# Patient Record
Sex: FEMALE | Race: WHITE
[De-identification: names, ages, dates, MRNs, and addresses within clinical notes are randomized per-mention and may not be internally consistent; named-entity substitution may affect disease eponyms.]

---

## 2018-04-26 ENCOUNTER — HOSPITAL ENCOUNTER (INPATIENT)
Dept: HOSPITAL 96 - M.ERS | Age: 58
LOS: 3 days | DRG: 208 | End: 2018-04-29
Attending: INTERNAL MEDICINE | Admitting: INTERNAL MEDICINE
Payer: COMMERCIAL

## 2018-04-26 VITALS — SYSTOLIC BLOOD PRESSURE: 91 MMHG | DIASTOLIC BLOOD PRESSURE: 62 MMHG

## 2018-04-26 VITALS — WEIGHT: 182.76 LBS | HEIGHT: 67.99 IN | BODY MASS INDEX: 27.7 KG/M2

## 2018-04-26 VITALS — SYSTOLIC BLOOD PRESSURE: 94 MMHG | DIASTOLIC BLOOD PRESSURE: 51 MMHG

## 2018-04-26 VITALS — DIASTOLIC BLOOD PRESSURE: 57 MMHG | SYSTOLIC BLOOD PRESSURE: 91 MMHG

## 2018-04-26 VITALS — SYSTOLIC BLOOD PRESSURE: 88 MMHG | DIASTOLIC BLOOD PRESSURE: 46 MMHG

## 2018-04-26 VITALS — DIASTOLIC BLOOD PRESSURE: 62 MMHG | SYSTOLIC BLOOD PRESSURE: 105 MMHG

## 2018-04-26 VITALS — SYSTOLIC BLOOD PRESSURE: 105 MMHG | DIASTOLIC BLOOD PRESSURE: 52 MMHG

## 2018-04-26 VITALS — DIASTOLIC BLOOD PRESSURE: 54 MMHG | SYSTOLIC BLOOD PRESSURE: 94 MMHG

## 2018-04-26 VITALS — DIASTOLIC BLOOD PRESSURE: 56 MMHG | SYSTOLIC BLOOD PRESSURE: 106 MMHG

## 2018-04-26 VITALS — DIASTOLIC BLOOD PRESSURE: 56 MMHG | SYSTOLIC BLOOD PRESSURE: 100 MMHG

## 2018-04-26 VITALS — SYSTOLIC BLOOD PRESSURE: 105 MMHG | DIASTOLIC BLOOD PRESSURE: 64 MMHG

## 2018-04-26 VITALS — DIASTOLIC BLOOD PRESSURE: 57 MMHG | SYSTOLIC BLOOD PRESSURE: 99 MMHG

## 2018-04-26 VITALS — SYSTOLIC BLOOD PRESSURE: 97 MMHG | DIASTOLIC BLOOD PRESSURE: 50 MMHG

## 2018-04-26 VITALS — SYSTOLIC BLOOD PRESSURE: 104 MMHG | DIASTOLIC BLOOD PRESSURE: 52 MMHG

## 2018-04-26 DIAGNOSIS — C79.31: ICD-10-CM

## 2018-04-26 DIAGNOSIS — A04.72: ICD-10-CM

## 2018-04-26 DIAGNOSIS — R65.10: ICD-10-CM

## 2018-04-26 DIAGNOSIS — C79.51: ICD-10-CM

## 2018-04-26 DIAGNOSIS — C34.11: ICD-10-CM

## 2018-04-26 DIAGNOSIS — N17.0: ICD-10-CM

## 2018-04-26 DIAGNOSIS — C79.49: ICD-10-CM

## 2018-04-26 DIAGNOSIS — C78.7: ICD-10-CM

## 2018-04-26 DIAGNOSIS — J96.01: ICD-10-CM

## 2018-04-26 DIAGNOSIS — Z79.899: ICD-10-CM

## 2018-04-26 DIAGNOSIS — J96.02: ICD-10-CM

## 2018-04-26 DIAGNOSIS — C79.70: ICD-10-CM

## 2018-04-26 DIAGNOSIS — D61.818: ICD-10-CM

## 2018-04-26 DIAGNOSIS — I08.3: ICD-10-CM

## 2018-04-26 DIAGNOSIS — R04.89: ICD-10-CM

## 2018-04-26 DIAGNOSIS — E43: ICD-10-CM

## 2018-04-26 DIAGNOSIS — J18.9: Primary | ICD-10-CM

## 2018-04-26 DIAGNOSIS — I82.432: ICD-10-CM

## 2018-04-26 DIAGNOSIS — Z88.8: ICD-10-CM

## 2018-04-26 LAB
ABSOLUTE MONOCYTES: 0 THOU/UL (ref 0–1.2)
ALBUMIN SERPL-MCNC: 2 G/DL (ref 3.4–5)
ALP SERPL-CCNC: 83 U/L (ref 46–116)
ALT SERPL-CCNC: 90 U/L (ref 30–65)
ANION GAP SERPL CALC-SCNC: 9 MMOL/L (ref 7–16)
APTT BLD: 29.6 SECONDS (ref 25–31.3)
AST SERPL-CCNC: 26 U/L (ref 15–37)
BE: -1.4 MMOL/L
BILIRUB SERPL-MCNC: 1.1 MG/DL
BUN SERPL-MCNC: 15 MG/DL (ref 7–18)
CALCIUM SERPL-MCNC: 7.3 MG/DL (ref 8.5–10.1)
CHLORIDE SERPL-SCNC: 95 MMOL/L (ref 98–107)
CO2 SERPL-SCNC: 24 MMOL/L (ref 21–32)
CREAT SERPL-MCNC: 0.8 MG/DL (ref 0.6–1.3)
DOHLE BOD BLD QL SMEAR: (no result)
GLUCOSE SERPL-MCNC: 120 MG/DL (ref 70–99)
GRANULOCYTES NFR BLD MANUAL: 54 %
HCO3 BLD-SCNC: 22.4 MMOL/L (ref 22–26)
HCT VFR BLD CALC: 21.4 % (ref 37–47)
HGB BLD-MCNC: 7.4 GM/DL (ref 12–15)
INR PPP: 1.2
LIPASE: 56 U/L (ref 73–393)
LYMPHOCYTES # BLD: 0.7 THOU/UL (ref 0.8–5.3)
LYMPHOCYTES NFR BLD AUTO: 37 %
MCH RBC QN AUTO: 29.9 PG (ref 26–34)
MCHC RBC AUTO-ENTMCNC: 34.5 G/DL (ref 28–37)
MCV RBC: 86.5 FL (ref 80–100)
MONOCYTES NFR BLD: 3 %
MPV: 8.6 FL. (ref 7.2–11.1)
NEUTROPHILS # BLD: 0.9 THOU/UL (ref 1.6–8.1)
NUCLEATED RBCS: 0 /100WBC
PCO2 BLD: 33.4 MMHG (ref 35–45)
PLATELET # BLD EST: (no result) 10*3/UL
PLATELET COUNT*: 10 THOU/UL (ref 150–400)
PO2 BLD: 65 MMHG (ref 75–100)
POTASSIUM SERPL-SCNC: 3.8 MMOL/L (ref 3.5–5.1)
PROT SERPL-MCNC: 6.4 G/DL (ref 6.4–8.2)
PROTHROMBIN TIME: 11.8 SECONDS (ref 9.2–11.5)
RBC # BLD AUTO: 2.47 MIL/UL (ref 4.2–5)
RDW-CV: 17 % (ref 10.5–14.5)
SODIUM SERPL-SCNC: 128 MMOL/L (ref 136–145)
TOXIC GRANULES BLD QL SMEAR: (no result)
TROPONIN-I LEVEL: 0.34 NG/ML (ref ?–0.06)
VARIANT LYMPHS NFR BLD MANUAL: 6 %
WBC # BLD AUTO: 1.6 THOU/UL (ref 4–11)
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
## 2018-04-26 NOTE — EKG
[place] [address]
Phone:  [phone]                     ELECTROCARDIOGRAM REPORT      
_______________________________________________________________________________
 
Name:       GARIMA TRENT              Room:           [address] [place]  
M.R.#:  [id number]      Account #:      [id number]  
Admission:  18     Attend Phys:    Ree Hudson MD 
Discharge:               Date of Birth:  [birth year]  
         Report #: [id number]
    [id number]
_______________________________________________________________________________
THIS REPORT FOR:  //name//                      
 
                         [facility] ED
                                       
Test Date:    2018               Test Time:    10:56:35
Pat Name:     GARIMA TRENT          Department:   
Patient ID:   [id number]            Room:         [facility]
Gender:       F                        Technician:   SHONDA SALEH
:          1960               Requested By: Shayan Padilla
Order Number: [id number] MD:   yT Reza
                                 Measurements
Intervals                              Axis          
Rate:         100                      P:            59
HI:           137                      QRS:          42
QRSD:         99                       T:            18
QT:           363                                    
QTc:          469                                    
                           Interpretive Statements
Sinus tachycardia
RSR' in V1 or V2, right VCD or RVH
No previous ECG available for comparison
 
Electronically Signed On 2018 16:38:57 CDT by Ty Reza
[email or web address]
 
 
 
 
 
 
 
 
 
 
 
 
 
 
 
 
 
 
 
  <ELECTRONICALLY SIGNED>
                                           By: Ty Reza MD, [facility]     
  18     9848
D: 18 1056   _____________________________________
T: 18 1056   Ty Reza MD, [facility]       /EPI

## 2018-04-26 NOTE — 2DMMODE
[place] [address]
Phone:  [phone] 2 D/M-MODE ECHOCARDIOGRAM     
_______________________________________________________________________________
 
Name:         GARIMA TRENT             Room:          [address] [place] 
[facility]#:    [id number]     Account #:     [id number]  
Admission:    18    Attend Phys:   Ree Hudson, 
Discharge:                Date of Birth: [birth year]  
Date of Service: 18 1519  Report #:      [id number]
        [id number]
_______________________________________________________________________________
THIS REPORT FOR:  //name//                      
 
 
--------------- APPROVED REPORT --------------
 
 
Study performed:  2018 13:44:57
 
EXAM: Comprehensive 2D, Doppler, and color-flow 
Echocardiogram 
Patient Location: In-Patient   
Room #:  ER     Status:  routine
 
     BSA:         1.88
HR: 91 bpm BP:          94/49 mmHg 
Rhythm: NSR    
 
Other Information 
Study Quality: Good
 
Indications
Elevated Troponin
 
2D Dimensions
   LVEF(%):  69.41 (&gt;50%)
IVSd:  12.52 (7-11mm) LVOT Diam:  21.62 (18-24mm) 
LVDd:  48.16 mm  
PWd:  10.40 (7-11mm) Ascending Ao:  28.80 (22-36mm)
LVDs:  29.34 (25-40mm) 
Aortic Root:  34.83 mm 
   Cole's LVEF:  69.41 %
 
Volumes
Left Atrial Volume (Systole) 
    LA ESV Index:  24.60 mL/m2
 
Aortic Valve
AoV Peak Too.:  2.80 m/s 
AO Peak Gr.:  31.34 mmHg LVOT Max P.69 mmHg
AO Mean Gr.:  19.58 mmHg LVOT Mean P.49 mmHg
    LVOT Max V:  1.08 m/s
AO V2 VTI:  53.89 cm  LVOT Mean V:  0.73 m/s
ELIAS (VTI):  1.52 cm2  LVOT V1 VTI:  22.32 cm
 
Mitral Valve
    E/A Ratio:  1.12
 
 
[place] [address]
Phone:  [phone]                     2 D/M-MODE ECHOCARDIOGRAM     
_______________________________________________________________________________
 
Name:         GARIMA TRENT             Room:          [address] [place] 
[facility]#:    [id number]     Account #:     [id number]  
Admission:    18    Attend Phys:   Ree Hudson, 
Discharge:                Date of Birth: [birth year]  
Date of Service: 18 1519  Report #:      [id number]
        [id number]
_______________________________________________________________________________
    MV Decel. Time:  157.47 ms
MV E Max Too.:  1.16 m/s 
MV PHT:  45.67 ms  
MVA (PHT):  4.82 cm2  
 
TDI
E/Lateral E':  8.29 E/Medial E':  8.29
   Medial E' Too.:  0.14 m/s
   Lateral E' Too.:  0.14 m/s
 
Pulmonary Valve
PV Peak Too.:  1.09 m/s PV Peak Gr.:  4.77 mmHg
 
Tricuspid Valve
TR Peak Gr.:  48.53 mmHg RVSP:  53.00 mmHg
 
Left Ventricle
The left ventricle is normal size. There is normal LV segmental wall 
motion. There is normal left ventricular wall thickness. Left 
ventricular systolic function is normal. The left ventricular 
ejection fraction is within the normal range. LVEF is 60-65%. The 
left ventricular diastolic function is normal.
 
Right Ventricle
The right ventricle is normal size. The right ventricular systolic 
function is normal.
 
Atria
The left atrium size is normal. The right atrium size is 
normal.
 
Aortic Valve
Mild aortic valve sclerosis. Mild aortic regurgitation. No 
hemodynamically significant valvular aortic stenosis.
 
Mitral Valve
The mitral valve is normal in structure. Mild mitral regurgitation. 
No evidence of mitral valve stenosis.
 
Tricuspid Valve
The tricuspid valve is normal in structure. Mild tricuspid 
regurgitation. The RVSP is 50-55 mmHg.
 
Pulmonic Valve
The pulmonary valve is normal in structure. There is no pulmonic 
valvular regurgitation.
 
 
[place] [address]
Phone:  [phone]                     2 D/M-MODE ECHOCARDIOGRAM     
_______________________________________________________________________________
 
Name:         GARIMA TRENT             Room:          [address] [place] 
[facility]#:    [id number]     Account #:     [id number]  
Admission:    18    Attend Phys:   Ree Hudson, 
Discharge:                Date of Birth: [birth year]  
Date of Service: 18 1519  Report #:      [id number]
        [id number]
_______________________________________________________________________________
 
Great Vessels
The aortic root is normal in size. IVC is normal in size and 
collapses with &gt;50% inspiration
 
Pericardium
There is no pericardial effusion.
 
&lt;Conclusion&gt;
The left ventricle is normal size.
There is normal left ventricular wall thickness.
Left ventricular systolic function is normal.
The left ventricular ejection fraction is within the normal 
range.
LVEF is 60-65%.
The left ventricular diastolic function is normal.
The right ventricle is normal size.
The left atrium size is normal.
Mild aortic valve sclerosis.
Mild aortic regurgitation.
No hemodynamically significant valvular aortic stenosis.
The mitral valve is normal in structure.
Mild mitral regurgitation.
The tricuspid valve is normal in structure.
IVC is normal in size and collapses with &gt;50% inspiration
There is no pericardial effusion.
 
 
 
 
 
 
 
 
 
 
 
 
 
 
 
 
 
 
  <ELECTRONICALLY SIGNED>
                                           By: Ty Reza MD, FACC     
  18     1519
D: 18   _____________________________________
T: 18 151   Ty Reza MD, FACC       /INF

## 2018-04-27 VITALS — SYSTOLIC BLOOD PRESSURE: 104 MMHG | DIASTOLIC BLOOD PRESSURE: 61 MMHG

## 2018-04-27 VITALS — DIASTOLIC BLOOD PRESSURE: 61 MMHG | SYSTOLIC BLOOD PRESSURE: 103 MMHG

## 2018-04-27 VITALS — DIASTOLIC BLOOD PRESSURE: 67 MMHG | SYSTOLIC BLOOD PRESSURE: 109 MMHG

## 2018-04-27 VITALS — DIASTOLIC BLOOD PRESSURE: 53 MMHG | SYSTOLIC BLOOD PRESSURE: 117 MMHG

## 2018-04-27 VITALS — SYSTOLIC BLOOD PRESSURE: 106 MMHG | DIASTOLIC BLOOD PRESSURE: 59 MMHG

## 2018-04-27 VITALS — SYSTOLIC BLOOD PRESSURE: 114 MMHG | DIASTOLIC BLOOD PRESSURE: 62 MMHG

## 2018-04-27 VITALS — DIASTOLIC BLOOD PRESSURE: 49 MMHG | SYSTOLIC BLOOD PRESSURE: 107 MMHG

## 2018-04-27 VITALS — DIASTOLIC BLOOD PRESSURE: 74 MMHG | SYSTOLIC BLOOD PRESSURE: 120 MMHG

## 2018-04-27 VITALS — SYSTOLIC BLOOD PRESSURE: 119 MMHG | DIASTOLIC BLOOD PRESSURE: 66 MMHG

## 2018-04-27 VITALS — DIASTOLIC BLOOD PRESSURE: 61 MMHG | SYSTOLIC BLOOD PRESSURE: 101 MMHG

## 2018-04-27 VITALS — DIASTOLIC BLOOD PRESSURE: 51 MMHG | SYSTOLIC BLOOD PRESSURE: 64 MMHG

## 2018-04-27 VITALS — DIASTOLIC BLOOD PRESSURE: 61 MMHG | SYSTOLIC BLOOD PRESSURE: 107 MMHG

## 2018-04-27 VITALS — DIASTOLIC BLOOD PRESSURE: 58 MMHG | SYSTOLIC BLOOD PRESSURE: 104 MMHG

## 2018-04-27 VITALS — SYSTOLIC BLOOD PRESSURE: 94 MMHG | DIASTOLIC BLOOD PRESSURE: 58 MMHG

## 2018-04-27 VITALS — SYSTOLIC BLOOD PRESSURE: 101 MMHG | DIASTOLIC BLOOD PRESSURE: 61 MMHG

## 2018-04-27 VITALS — DIASTOLIC BLOOD PRESSURE: 56 MMHG | SYSTOLIC BLOOD PRESSURE: 110 MMHG

## 2018-04-27 VITALS — SYSTOLIC BLOOD PRESSURE: 127 MMHG | DIASTOLIC BLOOD PRESSURE: 71 MMHG

## 2018-04-27 VITALS — DIASTOLIC BLOOD PRESSURE: 59 MMHG | SYSTOLIC BLOOD PRESSURE: 101 MMHG

## 2018-04-27 VITALS — DIASTOLIC BLOOD PRESSURE: 67 MMHG | SYSTOLIC BLOOD PRESSURE: 141 MMHG

## 2018-04-27 VITALS — SYSTOLIC BLOOD PRESSURE: 117 MMHG | DIASTOLIC BLOOD PRESSURE: 66 MMHG

## 2018-04-27 VITALS — DIASTOLIC BLOOD PRESSURE: 69 MMHG | SYSTOLIC BLOOD PRESSURE: 119 MMHG

## 2018-04-27 VITALS — SYSTOLIC BLOOD PRESSURE: 96 MMHG | DIASTOLIC BLOOD PRESSURE: 50 MMHG

## 2018-04-27 VITALS — DIASTOLIC BLOOD PRESSURE: 54 MMHG | SYSTOLIC BLOOD PRESSURE: 97 MMHG

## 2018-04-27 VITALS — DIASTOLIC BLOOD PRESSURE: 59 MMHG | SYSTOLIC BLOOD PRESSURE: 95 MMHG

## 2018-04-27 VITALS — DIASTOLIC BLOOD PRESSURE: 50 MMHG | SYSTOLIC BLOOD PRESSURE: 95 MMHG

## 2018-04-27 VITALS — DIASTOLIC BLOOD PRESSURE: 68 MMHG | SYSTOLIC BLOOD PRESSURE: 115 MMHG

## 2018-04-27 VITALS — DIASTOLIC BLOOD PRESSURE: 59 MMHG | SYSTOLIC BLOOD PRESSURE: 110 MMHG

## 2018-04-27 LAB
ABSOLUTE BASOPHILS: 0 THOU/UL (ref 0–0.2)
ABSOLUTE EOSINOPHILS: 0 THOU/UL (ref 0–0.7)
ABSOLUTE MONOCYTES: 0.7 THOU/UL (ref 0–1.2)
ALBUMIN SERPL-MCNC: 1.8 G/DL (ref 3.4–5)
ALP SERPL-CCNC: 72 U/L (ref 46–116)
ALT SERPL-CCNC: 59 U/L (ref 30–65)
ANION GAP SERPL CALC-SCNC: 11 MMOL/L (ref 7–16)
ANION GAP SERPL CALC-SCNC: 12 MMOL/L (ref 7–16)
ANION GAP SERPL CALC-SCNC: 7 MMOL/L (ref 7–16)
APTT BLD: 26.3 SECONDS (ref 25–31.3)
AST SERPL-CCNC: 28 U/L (ref 15–37)
BASOPHILS NFR BLD AUTO: 0.2 %
BE: -2.4 MMOL/L
BE: -5.4 MMOL/L
BILIRUB SERPL-MCNC: 0.8 MG/DL
BUN SERPL-MCNC: 10 MG/DL (ref 7–18)
BUN SERPL-MCNC: 7 MG/DL (ref 7–18)
BUN SERPL-MCNC: 7 MG/DL (ref 7–18)
CALCIUM SERPL-MCNC: 6.6 MG/DL (ref 8.5–10.1)
CALCIUM SERPL-MCNC: 6.7 MG/DL (ref 8.5–10.1)
CALCIUM SERPL-MCNC: 6.9 MG/DL (ref 8.5–10.1)
CHLORIDE SERPL-SCNC: 100 MMOL/L (ref 98–107)
CHLORIDE SERPL-SCNC: 101 MMOL/L (ref 98–107)
CHLORIDE SERPL-SCNC: 103 MMOL/L (ref 98–107)
CO2 SERPL-SCNC: 20 MMOL/L (ref 21–32)
CO2 SERPL-SCNC: 23 MMOL/L (ref 21–32)
CO2 SERPL-SCNC: 24 MMOL/L (ref 21–32)
CREAT SERPL-MCNC: 0.5 MG/DL (ref 0.6–1.3)
CREAT SERPL-MCNC: 0.6 MG/DL (ref 0.6–1.3)
CREAT SERPL-MCNC: 0.8 MG/DL (ref 0.6–1.3)
EOSINOPHIL NFR BLD: 0.1 %
GLUCOSE SERPL-MCNC: 139 MG/DL (ref 70–99)
GLUCOSE SERPL-MCNC: 142 MG/DL (ref 70–99)
GLUCOSE SERPL-MCNC: 162 MG/DL (ref 70–99)
GRANULOCYTES NFR BLD MANUAL: 70.9 %
HCO3 BLD-SCNC: 20.5 MMOL/L (ref 22–26)
HCO3 BLD-SCNC: 21.3 MMOL/L (ref 22–26)
HCT VFR BLD CALC: 20.3 % (ref 37–47)
HCT VFR BLD CALC: 26.8 % (ref 37–47)
HCT VFR BLD CALC: 26.9 % (ref 37–47)
HCT VFR BLD CALC: 28 % (ref 37–47)
HGB BLD-MCNC: 7 GM/DL (ref 12–15)
HGB BLD-MCNC: 9.2 GM/DL (ref 12–15)
HGB BLD-MCNC: 9.3 GM/DL (ref 12–15)
HGB BLD-MCNC: 9.7 GM/DL (ref 12–15)
INR PPP: 1.1
LYMPHOCYTES # BLD: 0.7 THOU/UL (ref 0.8–5.3)
LYMPHOCYTES NFR BLD AUTO: 14.6 %
MAGNESIUM SERPL-MCNC: 1.9 MG/DL (ref 1.8–2.4)
MAGNESIUM SERPL-MCNC: 2 MG/DL (ref 1.8–2.4)
MAGNESIUM SERPL-MCNC: 2.1 MG/DL (ref 1.8–2.4)
MCH RBC QN AUTO: 30.1 PG (ref 26–34)
MCH RBC QN AUTO: 30.1 PG (ref 26–34)
MCH RBC QN AUTO: 30.5 PG (ref 26–34)
MCHC RBC AUTO-ENTMCNC: 34.1 G/DL (ref 28–37)
MCHC RBC AUTO-ENTMCNC: 34.2 G/DL (ref 28–37)
MCHC RBC AUTO-ENTMCNC: 34.5 G/DL (ref 28–37)
MCV RBC: 87.3 FL (ref 80–100)
MCV RBC: 87.8 FL (ref 80–100)
MCV RBC: 89.3 FL (ref 80–100)
MONOCYTES NFR BLD: 14.2 %
MPV: 7.8 FL. (ref 7.2–11.1)
MPV: 8.2 FL. (ref 7.2–11.1)
MPV: 9 FL. (ref 7.2–11.1)
NEUTROPHILS # BLD: 3.5 THOU/UL (ref 1.6–8.1)
NUCLEATED RBCS: 0 /100WBC
PCO2 BLD: 32.2 MMHG (ref 35–45)
PCO2 BLD: 41.8 MMHG (ref 35–45)
PLATELET COUNT*: 14 THOU/UL (ref 150–400)
PLATELET COUNT*: 39 THOU/UL (ref 150–400)
PLATELET COUNT*: 42 THOU/UL (ref 150–400)
PO2 BLD: 74.2 MMHG (ref 75–100)
PO2 BLD: 94.1 MMHG (ref 75–100)
POTASSIUM SERPL-SCNC: 3.6 MMOL/L (ref 3.5–5.1)
POTASSIUM SERPL-SCNC: 3.6 MMOL/L (ref 3.5–5.1)
POTASSIUM SERPL-SCNC: 4.3 MMOL/L (ref 3.5–5.1)
PROT SERPL-MCNC: 5.9 G/DL (ref 6.4–8.2)
PROTHROMBIN TIME: 10.7 SECONDS (ref 9.2–11.5)
RBC # BLD AUTO: 2.31 MIL/UL (ref 4.2–5)
RBC # BLD AUTO: 3.01 MIL/UL (ref 4.2–5)
RBC # BLD AUTO: 3.21 MIL/UL (ref 4.2–5)
RDW-CV: 15.7 % (ref 10.5–14.5)
RDW-CV: 16 % (ref 10.5–14.5)
RDW-CV: 16.3 % (ref 10.5–14.5)
SODIUM SERPL-SCNC: 131 MMOL/L (ref 136–145)
SODIUM SERPL-SCNC: 132 MMOL/L (ref 136–145)
SODIUM SERPL-SCNC: 138 MMOL/L (ref 136–145)
WBC # BLD AUTO: 1 THOU/UL (ref 4–11)
WBC # BLD AUTO: 4.9 THOU/UL (ref 4–11)
WBC # BLD AUTO: 5.3 THOU/UL (ref 4–11)

## 2018-04-27 PROCEDURE — 5A1945Z RESPIRATORY VENTILATION, 24-96 CONSECUTIVE HOURS: ICD-10-PCS | Performed by: INTERNAL MEDICINE

## 2018-04-27 PROCEDURE — 30233R1 TRANSFUSION OF NONAUTOLOGOUS PLATELETS INTO PERIPHERAL VEIN, PERCUTANEOUS APPROACH: ICD-10-PCS

## 2018-04-27 PROCEDURE — 5A09357 ASSISTANCE WITH RESPIRATORY VENTILATION, LESS THAN 24 CONSECUTIVE HOURS, CONTINUOUS POSITIVE AIRWAY PRESSURE: ICD-10-PCS

## 2018-04-27 PROCEDURE — 30233N1 TRANSFUSION OF NONAUTOLOGOUS RED BLOOD CELLS INTO PERIPHERAL VEIN, PERCUTANEOUS APPROACH: ICD-10-PCS

## 2018-04-27 PROCEDURE — 0BH17EZ INSERTION OF ENDOTRACHEAL AIRWAY INTO TRACHEA, VIA NATURAL OR ARTIFICIAL OPENING: ICD-10-PCS | Performed by: INTERNAL MEDICINE

## 2018-04-27 NOTE — NUR
WOUND CARE NOTE:  CONSULT RECEIVED FOR DECUB SACRAL AREA.
 
RIGHT SIDE OF SACRUM:  STAGE 2 PRESSURE ULCER, RUPTURED BLISTER.  PINK, MOIST
WOUND BED.  WOUND MEASURES 3X2X0.1.  CLEANSED WITH WOUND CLEANSER, PATTED DRY.
 SKIN PREPPED.  APPLIED AQUACEL AG AND SECURED WITH A BORDERED FOAM.
 
LEFT SIDE OF SACRUM:  STAGE 3 PRESSURE ULCER.  PINK, MOIST, YELLOW WOUND BED.
WOUND MEASURES 1X1.2X0.3.  THOMAS-WOUND WITH NEW EPITHELIUM.  CLEANSED WITH
WOUND CLEANSER, PATTED DRY.  SKIN PREPPED.  APPLIED AQUACEL AG INTO WOUND BED
AND SECURED WITH BORDERED FOAM.
 
COCCYX: STAGE 3 PRESSURE ULCER.  PINK, MOIST, YELLOW WOUND BED.  WOUND
MEASURES 1.2X0.5X0.3.  THOMAS-WOUND MACERATED.  CLEANSED WITH WOUND CLEANSER,
PATTED DRY.  SKIN PREPPED.  APPLIED AQUACEL AG INTO WOUND BED AND SECURED WITH
BORDERED FOAM.
 
EDUCATED PATIENT AND DAUGHTER IN ORDER TO HEAL THE WOUND, WE HAD TO KEEP THE
PATIENT OFF OF THE WOUNDS AND USE WEDGES TO TURN SIDE TO SIDE.  COMMUNICATED
UNDERSTANDING AND PATIENT ALLOWED US TO USE WEDGES AND TURN TO RIGHT SIDE.
LOW AIR LOSS FUNCTION TURNED ON BED.  EDUCATED PATIENT AND DAUGHTER ON THIS
FUNCTION, COMMUNICATED UNDERSTANDING.
 
RECOMMEND
TURN Q2 HOURS, KEEP OFF WOUNDS
LOW AIR LOSS MATTRESS-ON
ENCOURAGE GOOD NUTRITION AND HYDRATION

## 2018-04-27 NOTE — NUR
PT INTUBATED DUE TO DECLINE IN RESPIRATORY STATUS. PT WAS NOT MAINTAINING
OXYGENATION % BIPAP. CHEST XRAY EXAMINED BY MD AND IT APPEARED TO BE
WORSE. RIGHT PORT ACCESSED BUT UNABLE TO PULL BLOOD BACK FROM. SAXENA PLACED
DUE TO BEING VENTILATED. OG PLACED FOR DECOMPRESSION. Q2H TURNS SIDE TO SIDE
FOR WOUNDS ON COCCYX. 1X LOOSE STOOLS, C-DIFF SAMPLE SENT.

## 2018-04-27 NOTE — NUR
Nutrition: Pt admitted with lung cancer with METS to brain. Assessed for
open wounds on buttocks. Per ICU rounds, pt is eating well on Regular diet.
Wt: 171#. Alb 1.8, prealb 10.2. Apparently, pt was given a short time to live.
RD will not order any nutritional supplements at this time. If desired, Boost+
and Beneprotein supplements are available on galleys. Severely depleted
visceral protein stores. Will defer nutrition unless otherwise consulted.

## 2018-04-27 NOTE — NUR
SPOKE WITH PT. DTR NOT HERE AT THIS TIME. PT HAS RECENTLY BEEN DIAGNOSED WITH
MET LUNG CANCER. SHE HAS [place] [employer] AND HAD BEEN STAYING WITH HER SON,
SHE HAS BEEN GETTING CHEMO. SHE NOW IS STAYING WITH HER DTR IN [place].
SHE DOES NOT HAVE HOME O2. PT ANSWERS DIRECT QUESTIONS, DOESN'T VOLUNTEER MUCH
INFORMATION. PER NURSING, THE DAUGHTER WAS TALKING TO THE DOCTOR IN FRONT OF
THE PT AND SAID THAT SHE HAD BEEN TOLD PT HAS 6 MONTHS OR LESS TO LIVE.
NURSING BELIEVES THIS WAS NEW INFORMATION TO THE PT OR SHE DIDN'T REMEMBER
HEARING IT BEFORE. PT HAS BEEN CRYING THIS MORNING. DISCUSSED ROLE OF CASE
MGT, WILL CONTINUE TO FOLLOW TO ASSIST WITH DISCHARGE PLANNING AND PROVIDE
SUPPORT TO PT AND FAMILY.

## 2018-04-27 NOTE — NUR
ASSUMED CARE OF PATIENT AT 1900. BLOOD INFUSING, TOLERATED WELL. PATIENT SOA,
REQUESTED NONREBREATHER. ABLE TO BREATH EASIER. BACK TO NASAL CANULA AFTER 2
HOURS. ABLE TO VOICE NEEDING THE BEDPAN APPROPRIATELY. SPOKE WITH DAUGHTER
BEFORE SHE WENT HOME FOR THE NIGHT. STATES FAMILY IS COMING IN TO TOWN IN THE
NEXT WEEK OR SO AND THEY WILL BE DISCUSSING THE SEVERITY OF HER CONDITION.
PATIENT AWARE THAT SHE HAS CANCER, BUT NOT AWARE OF THE EXTENT. NOT
PROGRESSING TOWARDS POC GOALS AT THIS TIME.

## 2018-04-28 VITALS — DIASTOLIC BLOOD PRESSURE: 61 MMHG | SYSTOLIC BLOOD PRESSURE: 94 MMHG

## 2018-04-28 VITALS — DIASTOLIC BLOOD PRESSURE: 72 MMHG | SYSTOLIC BLOOD PRESSURE: 107 MMHG

## 2018-04-28 VITALS — SYSTOLIC BLOOD PRESSURE: 98 MMHG | DIASTOLIC BLOOD PRESSURE: 66 MMHG

## 2018-04-28 VITALS — DIASTOLIC BLOOD PRESSURE: 70 MMHG | SYSTOLIC BLOOD PRESSURE: 107 MMHG

## 2018-04-28 VITALS — DIASTOLIC BLOOD PRESSURE: 53 MMHG | SYSTOLIC BLOOD PRESSURE: 88 MMHG

## 2018-04-28 VITALS — DIASTOLIC BLOOD PRESSURE: 67 MMHG | SYSTOLIC BLOOD PRESSURE: 105 MMHG

## 2018-04-28 VITALS — SYSTOLIC BLOOD PRESSURE: 95 MMHG | DIASTOLIC BLOOD PRESSURE: 50 MMHG

## 2018-04-28 VITALS — SYSTOLIC BLOOD PRESSURE: 88 MMHG | DIASTOLIC BLOOD PRESSURE: 56 MMHG

## 2018-04-28 VITALS — DIASTOLIC BLOOD PRESSURE: 57 MMHG | SYSTOLIC BLOOD PRESSURE: 92 MMHG

## 2018-04-28 VITALS — SYSTOLIC BLOOD PRESSURE: 106 MMHG | DIASTOLIC BLOOD PRESSURE: 68 MMHG

## 2018-04-28 VITALS — DIASTOLIC BLOOD PRESSURE: 65 MMHG | SYSTOLIC BLOOD PRESSURE: 96 MMHG

## 2018-04-28 VITALS — SYSTOLIC BLOOD PRESSURE: 102 MMHG | DIASTOLIC BLOOD PRESSURE: 69 MMHG

## 2018-04-28 VITALS — DIASTOLIC BLOOD PRESSURE: 60 MMHG | SYSTOLIC BLOOD PRESSURE: 101 MMHG

## 2018-04-28 VITALS — SYSTOLIC BLOOD PRESSURE: 122 MMHG | DIASTOLIC BLOOD PRESSURE: 71 MMHG

## 2018-04-28 VITALS — DIASTOLIC BLOOD PRESSURE: 54 MMHG | SYSTOLIC BLOOD PRESSURE: 83 MMHG

## 2018-04-28 VITALS — SYSTOLIC BLOOD PRESSURE: 92 MMHG | DIASTOLIC BLOOD PRESSURE: 48 MMHG

## 2018-04-28 VITALS — DIASTOLIC BLOOD PRESSURE: 70 MMHG | SYSTOLIC BLOOD PRESSURE: 103 MMHG

## 2018-04-28 VITALS — DIASTOLIC BLOOD PRESSURE: 61 MMHG | SYSTOLIC BLOOD PRESSURE: 101 MMHG

## 2018-04-28 LAB
ALBUMIN SERPL-MCNC: 2.8 G/DL (ref 3.4–5)
ALP SERPL-CCNC: 69 U/L (ref 46–116)
ALT SERPL-CCNC: 46 U/L (ref 30–65)
ANION GAP SERPL CALC-SCNC: 10 MMOL/L (ref 7–16)
APTT BLD: 28.4 SECONDS (ref 25–31.3)
AST SERPL-CCNC: 31 U/L (ref 15–37)
BE: -4.8 MMOL/L
BILIRUB SERPL-MCNC: 1 MG/DL
BUN SERPL-MCNC: 12 MG/DL (ref 7–18)
CALCIUM SERPL-MCNC: 6.9 MG/DL (ref 8.5–10.1)
CHLORIDE SERPL-SCNC: 104 MMOL/L (ref 98–107)
CO2 SERPL-SCNC: 24 MMOL/L (ref 21–32)
CREAT SERPL-MCNC: 0.9 MG/DL (ref 0.6–1.3)
GLUCOSE SERPL-MCNC: 153 MG/DL (ref 70–99)
HCO3 BLD-SCNC: 22.4 MMOL/L (ref 22–26)
HCT VFR BLD CALC: 24.5 % (ref 37–47)
HGB BLD-MCNC: 8.4 GM/DL (ref 12–15)
INR PPP: 1.1
MCH RBC QN AUTO: 30.1 PG (ref 26–34)
MCHC RBC AUTO-ENTMCNC: 34.4 G/DL (ref 28–37)
MCV RBC: 87.5 FL (ref 80–100)
MPV: 8.1 FL. (ref 7.2–11.1)
PCO2 BLD: 51.8 MMHG (ref 35–45)
PLATELET COUNT*: 28 THOU/UL (ref 150–400)
PO2 BLD: 106.8 MMHG (ref 75–100)
POTASSIUM SERPL-SCNC: 3.9 MMOL/L (ref 3.5–5.1)
PROT SERPL-MCNC: 6.5 G/DL (ref 6.4–8.2)
PROTHROMBIN TIME: 11 SECONDS (ref 9.2–11.5)
RBC # BLD AUTO: 2.81 MIL/UL (ref 4.2–5)
RDW-CV: 15.9 % (ref 10.5–14.5)
SODIUM SERPL-SCNC: 138 MMOL/L (ref 136–145)
WBC # BLD AUTO: 4.6 THOU/UL (ref 4–11)

## 2018-04-28 NOTE — NUR
RECEIVED PT FROM BURKE CONTRERAS. ASSESSMENTS AS CHARTED. AFEBRILE. PT SEDATED
WITH FENTANYL AND VERSED AND STILL ON VENT. PRESSURES SOFT. LOW URINE OUTPUT.
LASIX GIVEN AND BLACK ORDERED IF NEEDED. OG TO LIS. FAMILY IS AT BEDSIDE AND
TALKING ABOUT POSSIBLY WITHDRAWING CARE IF PT HAS NOT IMPROVED IN THE NEXT
COUPLE OF DAYS. WILL CONTINUE TO MONITOR.

## 2018-04-28 NOTE — NUR
RECIEVED REPORT AND ASSUMED CARE OF PT AT 1900. PT INTUBATED AND SEDATED ON
VENTILATOR WITH VERSED AND PROPOFOL DRTP.

## 2018-04-28 NOTE — NUR
MINIMAL PROGRESSION TOWARDS GOALS, SEE COMPUTERIZED ASSESSMENT CHARTING FOR
DETAILS, RT DECREASED FIO2 100% TO 90% ON VENTILATOR, TITRATED OFF OF PROPOFOL
PER ORDER, VERSED AND FENTANYL GTT FOR SEDATION, PLATELET DECREASED 28,000,
SR-ST TRACING CARDIAC MONITOR, BLOODY SPUTUM WITH INLINE SUCTIONING, CLEAR
THIN SPUTUM NOTED WITH ORAL SUCTIONING, NO BM DURING SHIFT, NO ADVERSE EFFECTS
FROM IV ANTIBIOTICS, UPDATED DAUGHTER VIA TELEPHONE THIS AM, LASIX HELPFUL FOR
DIURESIS 700CC TIAGO CLOUDY WITH SEDIMENT URINE OUT, BED IN LOW AND LOCKED
POSITON, HOURLY ROUNDING DONE PER POLICY.

## 2018-04-28 NOTE — EKG
[place] [address]
Phone:  [phone]                     ELECTROCARDIOGRAM REPORT      
_______________________________________________________________________________
 
Name:       GARIMA TRENT              Room:           [address]    [place]M [place]  
.R.#:  [id number]      Account #:      [id number]  
Admission:  18     Attend Phys:    Ree Hudson MD 
Discharge:               Date of Birth:  [birth year]  
         Report #: [id number]
    [id number]
_______________________________________________________________________________
THIS REPORT FOR:  //name//                      
 
                          [facility]
                                       
Test Date:    2018               Test Time:    18:53:36
Pat Name:     GARIMA TRENT          Department:   
Patient ID:   [id number]            Room:         [address]
Gender:       F                        Technician:   SHERITA
:          1960               Requested By: Raisa Jordan
Order Number: [id number]    Lilia MD:   Hiro Grande
                                 Measurements
Intervals                              Axis          
Rate:         105                      P:            43
IL:           145                      QRS:          53
QRSD:         102                      T:            19
QT:           383                                    
QTc:          507                                    
                           Interpretive Statements
Sinus tachycardia
Atrial premature complexes
RSR' in V1 or V2, right VCD or RVH
Borderline prolonged QT interval
Compared to ECG 2018 10:56:35
Atrial premature complex(es) now present
 
Electronically Signed On 2018 8:35:36 CDT by Hiro Grande
[email or web address]
 
 
 
 
 
 
 
 
 
 
 
 
 
 
 
 
  <ELECTRONICALLY SIGNED>
                                           By: Hiro Grande MD, FACC   
  18     0835
D: 18 1853   _____________________________________
T: 18 1853   Hiro Grande MD, FAC     /EPI

## 2018-04-29 VITALS — DIASTOLIC BLOOD PRESSURE: 54 MMHG | SYSTOLIC BLOOD PRESSURE: 85 MMHG

## 2018-04-29 VITALS — DIASTOLIC BLOOD PRESSURE: 50 MMHG | SYSTOLIC BLOOD PRESSURE: 91 MMHG

## 2018-04-29 VITALS — DIASTOLIC BLOOD PRESSURE: 53 MMHG | SYSTOLIC BLOOD PRESSURE: 88 MMHG

## 2018-04-29 VITALS — SYSTOLIC BLOOD PRESSURE: 99 MMHG | DIASTOLIC BLOOD PRESSURE: 56 MMHG

## 2018-04-29 VITALS — SYSTOLIC BLOOD PRESSURE: 84 MMHG | DIASTOLIC BLOOD PRESSURE: 51 MMHG

## 2018-04-29 VITALS — DIASTOLIC BLOOD PRESSURE: 48 MMHG | SYSTOLIC BLOOD PRESSURE: 81 MMHG

## 2018-04-29 VITALS — DIASTOLIC BLOOD PRESSURE: 60 MMHG | SYSTOLIC BLOOD PRESSURE: 90 MMHG

## 2018-04-29 VITALS — SYSTOLIC BLOOD PRESSURE: 86 MMHG | DIASTOLIC BLOOD PRESSURE: 55 MMHG

## 2018-04-29 VITALS — DIASTOLIC BLOOD PRESSURE: 52 MMHG | SYSTOLIC BLOOD PRESSURE: 83 MMHG

## 2018-04-29 VITALS — DIASTOLIC BLOOD PRESSURE: 48 MMHG | SYSTOLIC BLOOD PRESSURE: 78 MMHG

## 2018-04-29 LAB
ABSOLUTE MONOCYTES: 1.1 THOU/UL (ref 0–1.2)
ALBUMIN SERPL-MCNC: 2.4 G/DL (ref 3.4–5)
ALP SERPL-CCNC: 75 U/L (ref 46–116)
ALT SERPL-CCNC: 56 U/L (ref 30–65)
ANION GAP SERPL CALC-SCNC: 10 MMOL/L (ref 7–16)
ANISOCYTOSIS BLD QL SMEAR: (no result)
AST SERPL-CCNC: 46 U/L (ref 15–37)
BE: -7.2 MMOL/L
BILIRUB SERPL-MCNC: 0.8 MG/DL
BUN SERPL-MCNC: 33 MG/DL (ref 7–18)
CALCIUM SERPL-MCNC: 6.2 MG/DL (ref 8.5–10.1)
CHLORIDE SERPL-SCNC: 106 MMOL/L (ref 98–107)
CO2 SERPL-SCNC: 23 MMOL/L (ref 21–32)
CREAT SERPL-MCNC: 2 MG/DL (ref 0.6–1.3)
GLUCOSE SERPL-MCNC: 115 MG/DL (ref 70–99)
GRANULOCYTES NFR BLD MANUAL: 43 %
HCO3 BLD-SCNC: 21.4 MMOL/L (ref 22–26)
HCT VFR BLD CALC: 23.8 % (ref 37–47)
HGB BLD-MCNC: 7.8 GM/DL (ref 12–15)
LYMPHOCYTES # BLD: 0.1 THOU/UL (ref 0.8–5.3)
LYMPHOCYTES NFR BLD AUTO: 1 %
MAGNESIUM SERPL-MCNC: 2.3 MG/DL (ref 1.8–2.4)
MCH RBC QN AUTO: 30 PG (ref 26–34)
MCHC RBC AUTO-ENTMCNC: 32.9 G/DL (ref 28–37)
MCV RBC: 91.2 FL (ref 80–100)
MONOCYTES NFR BLD: 9 %
MPV: 8.7 FL. (ref 7.2–11.1)
NEUTROPHILS # BLD: 11.2 THOU/UL (ref 1.6–8.1)
NEUTS BAND NFR BLD: 47 %
NUCLEATED RBCS: 0 /100WBC
PCO2 BLD: 59.8 MMHG (ref 35–45)
PLATELET # BLD EST: (no result) 10*3/UL
PLATELET CLUMP BLD QL SMEAR: (no result)
PLATELET COUNT*: 32 THOU/UL (ref 150–400)
PO2 BLD: 56 MMHG (ref 75–100)
POLYCHROMASIA BLD QL SMEAR: (no result)
POTASSIUM SERPL-SCNC: 4.8 MMOL/L (ref 3.5–5.1)
PROT SERPL-MCNC: 5.9 G/DL (ref 6.4–8.2)
RBC # BLD AUTO: 2.61 MIL/UL (ref 4.2–5)
RDW-CV: 16.6 % (ref 10.5–14.5)
SODIUM SERPL-SCNC: 139 MMOL/L (ref 136–145)
TOXIC GRANULES BLD QL SMEAR: (no result)
WBC # BLD AUTO: 12.4 THOU/UL (ref 4–11)

## 2018-04-29 NOTE — NUR
FAMILY HAS NOW DECIDED TO MAKE PT COMFORT CARE AND WOULD LIKE TO WITHDRAW
CARE. NOTIFIED PHYSICAN AND HAVE ORDERS TO EXTUBATE. FAMILY AT BEDSIDE AND
WILL CONTINUE TO EVALUATE FOR NEEDS.

## 2018-04-29 NOTE — NUR
PT TRANSFERRED TO ROOM 228.COMFORT CARE.AGREE WITH PREVIOUS NURSES
ASSESSMENT.PT REMAINS 15L O2 HIGH FLOW.MORPHINE 8MG GIVEN.SCOPALAMINE PATCH
APPLIED.  ORAL CARE GIVEN.SAXENA SECURE AND IN PLACE.FAMILY AT BEDSIDE.
 
PT PASSED AWAY AT 1810.TWO RN CONFIRMED DEATH.CHARGE NURSE NOTIFIED.HOUSE
SUPERVISOR NOTIFIED.DOCTOR NOTIFIED.DEATH SUMMARY PAPERWORK COMPLETED.

## 2018-04-29 NOTE — NUR
RECEIVED REPORT FROM BURKE CONDON. ASSESSMENT AS CHARTED. AFEBRILE. PT ON VENT AND
SEDATED WITH FENTANYL AND VERSED. I UNITS OF PLATELETS TRANSFUSED YESTERDAY.
PRESSURES SOFT AND WILL START BLACK IF NEEDED. OG TO LIS. DR. QUIROS WILL MEET
WITH FAMILY AT 1000 TO DISCUSS PT STATUS. WILL CONTINUE TO MONITOR.

## 2018-04-30 NOTE — CON
[address]  [address]                    CONSULTATION                  
_______________________________________________________________________________
 
Name:       GARIMA TRENT              Room:           [address] [place]  
..#:  [id number]      Account #:      [id number]  
Admission:  04/26/18     Attend Phys:    Ree Hudson MD 
Discharge:  04/29/18     Date of Birth:  [birth year]  
         Report #: [id number]
                                                                     [id number]  
_______________________________________________________________________________
THIS REPORT FOR:  //name//                      
 
CC: Ree [facility]OD[facility]antic[facility]MAAN
 
DATE OF SERVICE:  04/27/2018
 
 
ATTENDING PHYSICIAN:  Dr. Ree Hudson.
 
The patient is located in the ICU bed 1.
 
INDICATION FOR CONSULTATION:  Acute hypoxic respiratory failure, stage IV lung
cancer, immunosuppressed, pneumonia.
 
CLINICAL SUMMARY:  The patient is a 57-year-old female, recent smoker with
multiple medical problems.  The patient was admitted a day or so ago for near
syncopal episode.  She was diagnosed with metastatic lung cancer within the last
6 weeks.  This was all done in [place], [place] and so records are fairly scarce.
 The patient was homeless and appears she is now living with her daughter in
[place].  The patient really does not understand the severity of her lung
disease.  She has had some nausea and vomiting.  She underwent a second or third
round of chemotherapy last week.  It appears to be carboplatin and Taxol, I am
not certain and she had some loose stools and she had some shortness of air. 
She did have a dry cough.  Denies any fever.  She is not the most accurate
historian.  She was on 3-4 liters yesterday.  This morning, she is up to 100%
nonrebreather and then, she was just recently placed on BiPAP because of
desaturation.
 
PAST MEDICAL HISTORY:  Again, anemia, elevated troponins, leukopenia, lung
cancer stage 4, believed to be non small cell, not exactly certain of cell type
[place], [place], respiratory failure and some thrombocytopenia due to
chemotherapy.
 
ALLERGIES:  SHE HAS ALLERGIES TO HYDROCODONE, WHICH GIVES HER NAUSEA AND
VOMITING.
 
MEDICATIONS:  Her outpatient medications, she was on Augmentin 875 one p.o.
t.i.d., benzonatate Perles 100 mg t.i.d., citalopram 10 mg daily.  She is on
Decadron tablets 4 mg t.i.d., could been tapering off from chemotherapy, folic
acid 1 mg daily, guaifenesin 200 mg 4 hours, hydrocodone, which is on hold and
then Zofran 4 mg IV q.  8 hours, Protonix 40 mg daily.
 
PAST SURGICAL HISTORY:  She has had a lung biopsy.  It appears she has brain and
 
 
 
[place] [address]                    CONSULTATION                  
_______________________________________________________________________________
 
Name:       GARIMA TRENT              Room:           [address] [place]  
M.R.#:  [id number]      Account #:      [id number]  
Admission:  04/26/18     Attend Phys:    Ree Hudson MD 
Discharge:  04/29/18     Date of Birth:  [birth year]  
         Report #: [id number]
                                                                     [id number]  
_______________________________________________________________________________
spread to the spine, also has liver mets on her CT as well as adrenal mets,
appears to be stage IV.
 
FAMILY HISTORY:  Negative for premature cardiopulmonary disease.
 
SOCIAL HISTORY:  The patient was homeless in [place].  Her daughter lives in
[place] who has brought her to live with her here.  A pack a day smoker and
she smoked for 45-50 years and just quit 6 weeks ago.  Denies any alcohol or
illicit drug use to me.
 
REVIEW OF SYSTEMS:  A 14-point review of systems was attempted, all was negative
except for cough and shortness of breath as mentioned in HPI.
 
PHYSICAL EXAMINATION:
GENERAL:  Somewhat thin, frail 57-year-old female, appears older than her stated
age, in mild distress.  She is currently on BiPAP.
VITAL SIGNS:  Blood pressure is 127/70 on no pressors, heart rate is 90,
respirations were 24-28 and somewhat labored.  Some use of accessory muscles. 
Temperature is 36.6 degrees, saturation is 94% on the current BiPAP 80%.  She is
5 feet 8 inches tall, weight 77 kilograms or 170 pounds, BMI is 26.
HEENT:  Nares and pharynx otherwise are clear.  Mucous membranes appear dry.  No
increase in jugular venous pressure.
NECK:  No cervical or supraclavicular adenopathy.  She has a Port-A-Cath in the
right upper chest.  It is currently accessed.
CHEST:  Shows rhonchi and wheeze in the right lower lobe inspiratory and
expiratory wheeze.  There is no cervical or supraclavicular adenopathy.  Left
chest shows rhonchi and expiratory wheeze also, some use of accessory muscles.
CARDIOVASCULAR:  Shows a 1/6 aortic outflow murmur with radiation to the
carotids.  Heart rate 93.  There is no delayed upstroke, no S3 is noted.
ABDOMEN:  Soft.  Liver appears prominent, palpable 2 cm below the costal margin.
 It is nontender.
EXTREMITIES:  Without cyanosis, clubbing or edema.
NEUROLOGIC:  Grossly intact.  She moves all fours to commands.  Nonfocal.
 
LABORATORY DATA:  When she was admitted on 04/26/2018, hemoglobin was 7.4, white
count was 1600, platelets were 10,000.  This morning on 04/27/2018, her
hemoglobin was 7, white count was 1.0 and platelets were 42,000, absolute
neutrophil count is 1300 at this time, again platelets today are 42,000. 
Chemistry:  Sodium is 131, potassium is 4.3, BUN was 7, creatinine 0.6 and a
carbon dioxide of 24, glucose is 139, calcium is 6.7.  Total protein was 5.9,
albumin is 1.8.  Serology is pending.  ABGs just back on the BiPAP on 80% and
10/6 with a backup rate of 12, pO2 of 74, pH 7.44, pCO2 is 32, bicarbonate is
21, sat was 93%.  Her venous Dopplers were positive for bilateral pulmonary DVT
and left popliteal vein extending from the calf veins.  No clots seen in the
left common femoral or deep femoral veins.  Right leg was clear.
 
 
 
 
[place] [address]                    CONSULTATION                  
_______________________________________________________________________________
 
Name:       GARIMA TRENT              Room:           M.228-P    [facility] [place]  
M.R.#:  [id number]      Account #:      [id number]  
Admission:  04/26/18     Attend Phys:    eRe Hudson MD 
Discharge:  04/29/18     Date of Birth:  [birth year]  
         Report #: [id number]
                                                                     [id number]  
_______________________________________________________________________________
IMPRESSION:
1.  Metastatic lung cancer stage IV with brain, spine, liver and adrenal mets
extremely poor prognosis, under chemotherapy.
2.  Immune suppressed pneumonia.
3.  Right leg thrombosis, most likely extending into deep venous thrombosis.  No
definite evidence of pulmonary emboli at least at this time.  Anticoagulation is
problematic with thrombocytopenia of platelet counts 10-40,000.  No active
bleeding at this time though.
4.  Neutropenia.
5.  Thrombocytopenia secondary to chemotherapy.
 
PLAN:  Agree with aggressive 3-drug therapy for antibiotics, possibly an IVC
filter is being considered, I agree with that, but the platelets being low, may
have to have platelet transfusions before this.  Agree with BiPAP with increase
over to 12/8.  We will give her a little higher PEEP levels and see if can
improve her pO2 a little bit.  Her prognosis even though she just recently
diagnosed was extremely guarded.  I have not had a chance to talk to the
daughter.  The patient may end up being intubated.  I think she would do poorly
on the ventilator.  She may need another central line and she has a Port-A-Cath
right now on the right, but may need more access, a triple lumen cath on the
left, etc. at some point in time again if platelets will allow.  We will see if
we can keep her off the ventilator.  She appears to be a full code blue at this
time and also see where the IVC filter is going.
 
This has been a 37 minute critical care consult.
 
 
 
 
 
 
 
 
 
 
 
 
 
 
 
 
 
 
 
<ELECTRONICALLY SIGNED>
                                        By:  Dora Dutta MD             
04/30/18     [id number]
D: 04/27/18 1457_______________________________________
T: 04/28/18 0618Rhett Ellis MD            /nt

## 2018-05-01 NOTE — NUR
RECIEVED O.T. ORDERS.  O.T. EVAL NOT COMPLETED DUE TO PT. INTUBATED  AND
PLACED ON HOLD.  PT. LATER PUT ON COMFORT MEASURES AND  .